# Patient Record
Sex: FEMALE | Race: WHITE
[De-identification: names, ages, dates, MRNs, and addresses within clinical notes are randomized per-mention and may not be internally consistent; named-entity substitution may affect disease eponyms.]

---

## 2017-05-05 ENCOUNTER — HOSPITAL ENCOUNTER (OUTPATIENT)
Dept: HOSPITAL 84 - D.MAMMO | Age: 62
Discharge: HOME | End: 2017-05-05
Attending: FAMILY MEDICINE
Payer: COMMERCIAL

## 2017-05-05 DIAGNOSIS — Z12.31: Primary | ICD-10-CM

## 2017-06-20 ENCOUNTER — HOSPITAL ENCOUNTER (OUTPATIENT)
Dept: HOSPITAL 84 - D.MAMMO | Age: 62
End: 2017-06-20
Attending: FAMILY MEDICINE
Payer: COMMERCIAL

## 2017-06-20 DIAGNOSIS — R92.8: Primary | ICD-10-CM

## 2017-09-29 ENCOUNTER — HOSPITAL ENCOUNTER (OUTPATIENT)
Dept: HOSPITAL 84 - D.MAMMO | Age: 62
Discharge: HOME | End: 2017-09-29
Attending: FAMILY MEDICINE
Payer: COMMERCIAL

## 2017-09-29 DIAGNOSIS — R92.8: Primary | ICD-10-CM

## 2018-05-10 ENCOUNTER — HOSPITAL ENCOUNTER (OUTPATIENT)
Dept: HOSPITAL 84 - D.MAMMO | Age: 63
Discharge: HOME | End: 2018-05-10
Attending: FAMILY MEDICINE
Payer: COMMERCIAL

## 2018-05-10 DIAGNOSIS — R92.8: Primary | ICD-10-CM

## 2018-05-11 ENCOUNTER — HOSPITAL ENCOUNTER (OUTPATIENT)
Dept: HOSPITAL 84 - D.MRI | Age: 63
Discharge: HOME | End: 2018-05-11
Attending: CLINICAL NURSE SPECIALIST
Payer: COMMERCIAL

## 2018-05-11 DIAGNOSIS — M79.671: Primary | ICD-10-CM

## 2018-06-13 ENCOUNTER — HOSPITAL ENCOUNTER (OUTPATIENT)
Dept: HOSPITAL 84 - D.MRI | Age: 63
Discharge: HOME | End: 2018-06-13
Attending: FAMILY MEDICINE
Payer: COMMERCIAL

## 2018-06-13 DIAGNOSIS — M54.6: Primary | ICD-10-CM

## 2018-08-29 ENCOUNTER — HOSPITAL ENCOUNTER (OUTPATIENT)
Dept: HOSPITAL 84 - D.MRI | Age: 63
Discharge: HOME | End: 2018-08-29
Attending: CLINICAL NURSE SPECIALIST
Payer: COMMERCIAL

## 2018-08-29 DIAGNOSIS — M25.522: Primary | ICD-10-CM

## 2018-11-27 ENCOUNTER — HOSPITAL ENCOUNTER (OUTPATIENT)
Dept: HOSPITAL 84 - D.MAMMO | Age: 63
Discharge: HOME | End: 2018-11-27
Attending: FAMILY MEDICINE
Payer: COMMERCIAL

## 2018-11-27 DIAGNOSIS — R92.8: Primary | ICD-10-CM

## 2018-12-18 ENCOUNTER — HOSPITAL ENCOUNTER (OUTPATIENT)
Dept: HOSPITAL 84 - D.LABREF | Age: 63
Discharge: HOME | End: 2018-12-18
Attending: ORTHOPAEDIC SURGERY
Payer: COMMERCIAL

## 2018-12-18 DIAGNOSIS — M17.12: Primary | ICD-10-CM

## 2018-12-18 DIAGNOSIS — Z11.8: ICD-10-CM

## 2019-01-30 ENCOUNTER — HOSPITAL ENCOUNTER (INPATIENT)
Dept: HOSPITAL 84 - D.CVICU | Age: 64
LOS: 8 days | Discharge: HOME | DRG: 470 | End: 2019-02-07
Attending: ORTHOPAEDIC SURGERY | Admitting: ORTHOPAEDIC SURGERY
Payer: COMMERCIAL

## 2019-01-30 VITALS — BODY MASS INDEX: 35.41 KG/M2 | WEIGHT: 180.38 LBS | HEIGHT: 60 IN

## 2019-01-30 DIAGNOSIS — M17.12: Primary | ICD-10-CM

## 2019-01-30 DIAGNOSIS — I10: ICD-10-CM

## 2019-01-30 DIAGNOSIS — G47.30: ICD-10-CM

## 2019-01-30 LAB
ANION GAP SERPL CALC-SCNC: 17.5 MMOL/L (ref 8–16)
APPEARANCE UR: CLEAR
APTT BLD: 27.2 SECONDS (ref 22.8–39.4)
BASOPHILS NFR BLD AUTO: 0.3 % (ref 0–2)
BILIRUB SERPL-MCNC: NEGATIVE MG/DL
BUN SERPL-MCNC: 20 MG/DL (ref 7–18)
CALCIUM SERPL-MCNC: 9.1 MG/DL (ref 8.5–10.1)
CHLORIDE SERPL-SCNC: 106 MMOL/L (ref 98–107)
CO2 SERPL-SCNC: 23.6 MMOL/L (ref 21–32)
COLOR UR: YELLOW
CREAT SERPL-MCNC: 1 MG/DL (ref 0.6–1.3)
EOSINOPHIL NFR BLD: 2.7 % (ref 0–7)
ERYTHROCYTE [DISTWIDTH] IN BLOOD BY AUTOMATED COUNT: 13.8 % (ref 11.5–14.5)
GLUCOSE SERPL-MCNC: 102 MG/DL (ref 74–106)
GLUCOSE SERPL-MCNC: NEGATIVE MG/DL
HCT VFR BLD CALC: 43.2 % (ref 36–48)
HGB BLD-MCNC: 13.8 G/DL (ref 12–16)
IMM GRANULOCYTES NFR BLD: 0.4 % (ref 0–5)
INR PPP: 0.96 (ref 0.85–1.17)
KETONES UR STRIP-MCNC: NEGATIVE MG/DL
LYMPHOCYTES NFR BLD AUTO: 26 % (ref 15–50)
MCH RBC QN AUTO: 27.8 PG (ref 26–34)
MCHC RBC AUTO-ENTMCNC: 31.9 G/DL (ref 31–37)
MCV RBC: 87.1 FL (ref 80–100)
MONOCYTES NFR BLD: 6.6 % (ref 2–11)
NEUTROPHILS NFR BLD AUTO: 64 % (ref 40–80)
NITRITE UR-MCNC: NEGATIVE MG/ML
OSMOLALITY SERPL CALC.SUM OF ELEC: 287 MOSM/KG (ref 275–300)
PH UR STRIP: 5 [PH] (ref 5–6)
PLATELET # BLD: 274 10X3/UL (ref 130–400)
PMV BLD AUTO: 11 FL (ref 7.4–10.4)
POTASSIUM SERPL-SCNC: 4.1 MMOL/L (ref 3.5–5.1)
PROT UR-MCNC: NEGATIVE MG/DL
PROTHROMBIN TIME: 12.3 SECONDS (ref 11.6–15)
RBC # BLD AUTO: 4.96 10X6/UL (ref 4–5.4)
SODIUM SERPL-SCNC: 143 MMOL/L (ref 136–145)
SP GR UR STRIP: 1.02 (ref 1–1.02)
UROBILINOGEN UR-MCNC: NORMAL MG/DL
WBC # BLD AUTO: 10.4 10X3/UL (ref 4.8–10.8)

## 2019-02-04 VITALS — DIASTOLIC BLOOD PRESSURE: 59 MMHG | SYSTOLIC BLOOD PRESSURE: 118 MMHG

## 2019-02-04 VITALS — DIASTOLIC BLOOD PRESSURE: 58 MMHG | SYSTOLIC BLOOD PRESSURE: 123 MMHG

## 2019-02-04 VITALS — DIASTOLIC BLOOD PRESSURE: 55 MMHG | SYSTOLIC BLOOD PRESSURE: 102 MMHG

## 2019-02-04 VITALS — SYSTOLIC BLOOD PRESSURE: 119 MMHG | DIASTOLIC BLOOD PRESSURE: 53 MMHG

## 2019-02-04 VITALS — SYSTOLIC BLOOD PRESSURE: 109 MMHG | DIASTOLIC BLOOD PRESSURE: 52 MMHG

## 2019-02-04 VITALS — DIASTOLIC BLOOD PRESSURE: 68 MMHG | SYSTOLIC BLOOD PRESSURE: 108 MMHG

## 2019-02-04 VITALS — DIASTOLIC BLOOD PRESSURE: 64 MMHG | SYSTOLIC BLOOD PRESSURE: 106 MMHG

## 2019-02-04 VITALS — SYSTOLIC BLOOD PRESSURE: 96 MMHG | DIASTOLIC BLOOD PRESSURE: 58 MMHG

## 2019-02-04 VITALS — SYSTOLIC BLOOD PRESSURE: 117 MMHG | DIASTOLIC BLOOD PRESSURE: 58 MMHG

## 2019-02-04 VITALS — SYSTOLIC BLOOD PRESSURE: 116 MMHG | DIASTOLIC BLOOD PRESSURE: 57 MMHG

## 2019-02-04 PROCEDURE — 0SRD0JA REPLACEMENT OF LEFT KNEE JOINT WITH SYNTHETIC SUBSTITUTE, UNCEMENTED, OPEN APPROACH: ICD-10-PCS | Performed by: ORTHOPAEDIC SURGERY

## 2019-02-04 NOTE — NUR
SUPINE IN BED, CPM IN USE. A&O X 4. INCENTIVE SPIROMETER AT BEDSIDE, PT
VERBALIZED/DEMONSTRATED KNOWLEDGE ON HOW TO USE.
DENIES PAIN AND STATES SHE IS STILL NUMB. DRESSING
TO LEFT KNEE IS CLEAN/DRY/INTACT. SCD AND PLEXIPULSE IN USE. REMOVED FROM CPM,
NO COMPLAINTS/REQUESTS AT THIS TIME, WILL CONTINUE TO MONITOR.

## 2019-02-04 NOTE — NUR
INCENTIVE SPIROMETRY GIVEN TO PATIENT AND INSTRUCTED ON USE. ABLE TO PULL
2000CC EACH TIME X 3. LEFT AT BEDSIDE FOR USE. STATES NO COMPLAINTS OF PAIN OR
DISCOMFORT AT PRESENT

## 2019-02-04 NOTE — OP
PATIENT NAME:  HELEN RODRIGUES                        MEDICAL RECORD: [id number]
:51                                             LOCATION:D.MS [id number]
                                                         ADMISSION DATE:19
SURGEON:  LOUIE PETTY MD        
 
 
DATE OF OPERATION:  2019
 
PREOPERATIVE DIAGNOSIS:  Degenerative arthritis, left knee.
 
POSTOPERATIVE DIAGNOSIS:  Degenerative arthritis, left knee.
 
PROCEDURE:  Left total knee arthroplasty -- press-fit Triathlon.
 
SURGEON:  Louie Petty MD
 
assistant responds.
 
FIRST ASSISTANT:  CLOTILDE Greenberg
 
INTRAOPERATIVE COMPLICATIONS:  None.
 
SUMMARY OF PATHOLOGIC FINDINGS:  The patient had significant degenerative
arthritis of the left knee consistent with the preoperative diagnosis.
 
IMPLANTS USED:  [address] Triathlon total knee arthroplasty, 4 distal femur, 11
polyethylene insert, 31 press-fit patella, size 4 tibial baseplate press-fit.
 
INTRAOPERATIVE FINDINGS:  The patient had severe tricompartmental osteoarthritis
mostly severe in the medial and patellofemoral compartment.
 
OPERATIVE SUMMARY IN DETAIL:  After obtaining the appropriate preoperative
orthopedic surgery consent as well as anesthetic consultation, evaluation and
clearance, the patient was brought to the operating room and placed on the
operating table in supine position.  After general laryngeal mask airway was
administered, tourniquet was placed on the proximal aspect of the left lower
extremity.  Left lower extremity was then prepped and draped in routine sterile
fashion.  The leg was elevated and exsanguinated, tourniquet was inflated to 350
mmHg.  Midline incision was taken down for paramedian arthrotomy.  Patella was
everted and distal femoral soft tissue was excised in the usual fashion. 
Intramedullary guide hole was created for distal femoral intramedullary guided
cut.  This was cut for a 10 mm resection.  Proximal tibia was exposed and
excised in its entirety.  Soft tissue excision was again done in the usual
fashion.  An intramedullary guide hole was created to create an intramedullary
guided tibial cut.  Measurements were taken.  Distal chamfer cuts were made. 
Trials were put into place corresponding to the above final components.  The
knee was taken through range of motion and found to be stable in all planes. 
Final distal femoral preparations were followed by proximal tibial preparations
for press fitting.  The arthritic surface of the patella was excised and it was
prepared for press fitting the patella.  Having completed this, trials were
taken out.  The knee was copiously irrigated in pulsatile lavage fashion.  Final
components were then press-fit into place.  The knee was taken through range of
motion and found to be stable in all planes with good patellar tracking.  Prior
to closure of the paramedian arthrotomy, the knee was laden with vancomycin
powder 1 gram, tobramycin powder 1 gram, and TXA.  Paramedian arthrotomy was
closed with #2 Ethibond followed by #1 Vicryl, 2-0 Vicryl, and skin staples. 
Sterile dressings were applied.  Tourniquet was deflated.  The patient was
awakened and taken to recovery room in stable condition.  Please note, Rich
 
 
 
OPERATIVE REPORT                               [id number]    HELEN RODRIGUES assisted in closure.  All final needle and sponge counts were correct.
 
TRANSINT:[id number] Voice Confirmation ID: [id number] DOCUMENT ID: [id number]
                                           
                                           MALINDA SLAUGHTER, LOUIE JACKMAN        
 
 
 
Electronically Signed by LOUIE PETTY MD on 19 at 1401
 
 
 
 
 
 
 
 
 
 
 
 
 
 
 
 
 
 
 
 
 
 
 
 
 
 
 
 
 
 
 
 
 
 
 
 
 
 
CC:                                                             [id number]
DICTATION DATE: 19     :     19 0953      ADM IN  
                                                                              
[facility]                                          
[address] [id number]

## 2019-02-04 NOTE — NUR
PT STATES NO PAIN OR DISCOMFORT AT PRESENT. PLACED IN CPM  WILL CONTINUE TO
MONITOR. CALL LIGHT IN REACH

## 2019-02-05 VITALS — SYSTOLIC BLOOD PRESSURE: 137 MMHG | DIASTOLIC BLOOD PRESSURE: 57 MMHG

## 2019-02-05 VITALS — SYSTOLIC BLOOD PRESSURE: 125 MMHG | DIASTOLIC BLOOD PRESSURE: 62 MMHG

## 2019-02-05 VITALS — DIASTOLIC BLOOD PRESSURE: 73 MMHG | SYSTOLIC BLOOD PRESSURE: 114 MMHG

## 2019-02-05 VITALS — SYSTOLIC BLOOD PRESSURE: 121 MMHG | DIASTOLIC BLOOD PRESSURE: 60 MMHG

## 2019-02-05 VITALS — DIASTOLIC BLOOD PRESSURE: 59 MMHG | SYSTOLIC BLOOD PRESSURE: 121 MMHG

## 2019-02-05 LAB
APPEARANCE UR: CLEAR
BILIRUB SERPL-MCNC: NEGATIVE MG/DL
COLOR UR: YELLOW
ERYTHROCYTE [DISTWIDTH] IN BLOOD BY AUTOMATED COUNT: 13.8 % (ref 11.5–14.5)
GLUCOSE SERPL-MCNC: NEGATIVE MG/DL
HCT VFR BLD CALC: 36.1 % (ref 36–48)
HGB BLD-MCNC: 11.5 G/DL (ref 12–16)
KETONES UR STRIP-MCNC: NEGATIVE MG/DL
MCH RBC QN AUTO: 27.4 PG (ref 26–34)
MCHC RBC AUTO-ENTMCNC: 31.9 G/DL (ref 31–37)
MCV RBC: 86 FL (ref 80–100)
NITRITE UR-MCNC: NEGATIVE MG/ML
PH UR STRIP: 6 [PH] (ref 5–6)
PLATELET # BLD: 212 10X3/UL (ref 130–400)
PMV BLD AUTO: 11 FL (ref 7.4–10.4)
PROT UR-MCNC: NEGATIVE MG/DL
RBC # BLD AUTO: 4.2 10X6/UL (ref 4–5.4)
SP GR UR STRIP: 1.01 (ref 1–1.02)
UROBILINOGEN UR-MCNC: NORMAL MG/DL
WBC # BLD AUTO: 16.8 10X3/UL (ref 4.8–10.8)

## 2019-02-05 NOTE — MORECARE
CASE MANAGEMENT DISCHARGE SUMMARY
 
 
PATIENT: HELEN RODRIGUES                  UNIT: [id number]
ACCOUNT#: [id number]                       ADM DATE: 19
AGE: 63     : [birth year]  SEX: F            ROOM/BED: D.2216    
AUTHOR: ED,DOC                             PHYSICIAN:                               
 
REFERRING PHYSICIAN: LOUIE PETTY MD        
DATE OF SERVICE: 19
Discharge Plan
 
 
Patient Name: HELEN RODRIGUES
Facility: [facility]:[place]
Encounter #: [id number]
Medical Record #: [id number]
: 1955
Planned Disposition: Home or Self Care
Anticipated Discharge Date: 
 
Discharge Date: 
Expected LOS: 
Initial Reviewer: [email or web address]
Initial Review Date: 2019
Generated: 19   6:16 pm 
Comments
 
DCP- Discharge Planning
 
Updated by [id number]: Nely Gottlieb on 19   4:14 pm CT
Patient Name: HELEN RODRIGUES                                     
Admission Status: Elective   
Accout number: [id number]                              
Admission Date: 2019   
: 1955                                                        
Admission Diagnosis:   
Attending: LOUIE PETTY                                               
Current LOS:  1   
  
Anticipated DC Date:    
Planned Disposition: Home or Self Care   
[facility]   
  
  
Discharge Planning Comments:   
CM met with patient to assess discharge planning needs. Patient lives 
independently at home where she plans to return at discharge. Her  
will be the one to drive her home at DC. She will do her OP PT @ [facility]. CM 
will set up that appointment prior to DC. She has a walker, CPM and BSC at 
 
home. There are 5 steps to enter in her home. CM will continue to follow and 
assist with DC planning.   
  
  
  
  
  
: Nely Gottlieb
 DCPIA - Discharge Planning Initial Assessment
 
Updated by [email or web address]: Nely Gottlieb on 19   5:12 pm
*  Is the patient Alert and Oriented?
Yes
*  How many steps to enter\exit or inside your home?
 
*  PCP
[facility]
*  Pharmacy
[facility]
*  Preadmission Environment
Home with Family
*  ADLs
Independent
*  Equipment
Bedside Commode
Walker
*  Other Equipment
CPM
*  List name and contact numbers for known caregivers / representatives who 
currently or will assist patient after discharge:
Bam (spouse) [phone]
*  Verbal permission to speak to the caregivers and representatives has been 
obtained from the patient.
N/A
*  Community resources currently utilized
None
*  Additional services required to return to the preadmission environment?
Yes
*  Can the patient safely return to the preadmission environment?
Yes
*  Has this patient been hospitalized within the prior 30 days at any 
hospital?
No
 
 
 
 
 
 
Patient Name: HELEN RODRIGUES
 
Encounter #: [id number]
Page [phone]
 
 
 
 
 
Electronically Signed by GINA WARD on 19 at 1716
 
 
 
 
 
 
**All edits/amendments must be made on the electronic document**
 
DICTATION DATE: 19     : GEORGINA  19     
RPT#: [id number]                                DC DATE:        
                                               STATUS: ADM IN  
[facility]
[address] [place] [address]
***END OF REPORT***

## 2019-02-06 VITALS — SYSTOLIC BLOOD PRESSURE: 117 MMHG | DIASTOLIC BLOOD PRESSURE: 50 MMHG

## 2019-02-06 VITALS — SYSTOLIC BLOOD PRESSURE: 121 MMHG | DIASTOLIC BLOOD PRESSURE: 58 MMHG

## 2019-02-06 VITALS — DIASTOLIC BLOOD PRESSURE: 59 MMHG | SYSTOLIC BLOOD PRESSURE: 132 MMHG

## 2019-02-06 VITALS — SYSTOLIC BLOOD PRESSURE: 118 MMHG | DIASTOLIC BLOOD PRESSURE: 54 MMHG

## 2019-02-06 VITALS — SYSTOLIC BLOOD PRESSURE: 130 MMHG | DIASTOLIC BLOOD PRESSURE: 54 MMHG

## 2019-02-06 VITALS — DIASTOLIC BLOOD PRESSURE: 62 MMHG | SYSTOLIC BLOOD PRESSURE: 181 MMHG

## 2019-02-06 LAB
ERYTHROCYTE [DISTWIDTH] IN BLOOD BY AUTOMATED COUNT: 14.1 % (ref 11.5–14.5)
HCT VFR BLD CALC: 36.7 % (ref 36–48)
HGB BLD-MCNC: 11.5 G/DL (ref 12–16)
MCH RBC QN AUTO: 27.1 PG (ref 26–34)
MCHC RBC AUTO-ENTMCNC: 31.3 G/DL (ref 31–37)
MCV RBC: 86.6 FL (ref 80–100)
PLATELET # BLD: 216 10X3/UL (ref 130–400)
PMV BLD AUTO: 11.3 FL (ref 7.4–10.4)
RBC # BLD AUTO: 4.24 10X6/UL (ref 4–5.4)
WBC # BLD AUTO: 11.8 10X3/UL (ref 4.8–10.8)

## 2019-02-06 NOTE — NUR
PT SITTING UP IN BED, NO SIGNS OF DISTRESS. ALERT AND ORIENTED. LEFT FA IV SL.
LEFT KNEE WRAPPED IN ACE WRAP. TAKEN OFF CPM. PT GIVEN VISTARIL AND OXY AS
ORDERED FOR PAIN 4/10. NO OTHER NEEDS OR COMPLAINTS AT THIS TIME. SCD TO LEFT
LEG, PLEXI TO RIGHT. CL IN REACH, WILL CONTINUE TO MONITOR

## 2019-02-07 VITALS — SYSTOLIC BLOOD PRESSURE: 133 MMHG | DIASTOLIC BLOOD PRESSURE: 63 MMHG

## 2019-02-07 VITALS — SYSTOLIC BLOOD PRESSURE: 108 MMHG | DIASTOLIC BLOOD PRESSURE: 57 MMHG

## 2019-02-07 NOTE — NUR
PT IS RESTING IN BED WITH EYES OPEN. RESPIRATIONS ARE EVEN AND UNLABORED. PT
REPORTS PAIN 3/10 IN KNEE AREA. WILL ADDRESS. PT WITH CPM TO LEFT LOWER
EXTREMITY. DRESSING IS C/D/I. PT MACK NUMBNESS AND/OR TINGLING IN LOWER
EXTREMITIES. BED IS IN THE LOWEST POSITION. CALL LIGHT AND BED SIDE TABLE ARE
WITHIN REACH. WILL CONT TO MONITOR.

## 2019-02-07 NOTE — NUR
PT IS SITTING ON SIDE OF BED. FAMILY IS PRESENT TRANSPORT PT HOME. PIV REMOVED
WITH CATHETER TIP INTACT. PT DENIES QUESTIONS AND/OR CONCERNS AT THIS TIME.
VOLUNTEERS CONTACTED TO TRANSPORT PT OUT OF ROOM.

## 2019-02-07 NOTE — MORECARE
CASE MANAGEMENT DISCHARGE SUMMARY
 
 
PATIENT: HELEN RODRIGUES                  UNIT: [id number]
ACCOUNT#: [id number]                       ADM DATE: 19
AGE: 63     : 51  SEX: F            ROOM/BED: D.2216    
AUTHOR: ED,DOC                             PHYSICIAN:                               
 
REFERRING PHYSICIAN: LOUIE PETTY MD        
DATE OF SERVICE: 19
Discharge Plan
 
 
Patient Name: HELEN RODRIGUES
Facility: [facility]:[place]
Encounter #: [id number]
Medical Record #: [id number]
: 1955
Planned Disposition: Home or Self Care
Anticipated Discharge Date: 
 
Discharge Date: 
Expected LOS: 
Initial Reviewer: [id number]
Initial Review Date: 2019
Generated: 19  10:28 am 
Comments
 
DCP- Discharge Planning
 
Updated by [email or web address]: Nely Gottlieb on 19   4:14 pm CT
Patient Name: HELEN RODRIGUES                                     
Admission Status: Elective   
Accout number: [id number]                              
Admission Date: 2019   
: 1955                                                        
Admission Diagnosis:   
Attending: LOUIE PETTY                                               
Current LOS:  1   
  
Anticipated DC Date:    
Planned Disposition: Home or Self Care   
Primary Insurance: [employer] [facility]   
  
  
Discharge Planning Comments:   
CM met with patient to assess discharge planning needs. Patient lives 
independently at home where she plans to return at discharge. Her  
will be the one to drive her home at DC. She will do her OP PT @ [facility]. CM 
will set up that appointment prior to DC. She has a walker, CPM and BSC at 
 
home. There are 5 steps to enter in her home. CM will continue to follow and 
assist with DC planning.   
  
  
  
  
  
: Nely Gottlieb
 DCPIA - Discharge Planning Initial Assessment
 
Updated by [email or web address]: Nely Gottlieb on 19   5:12 pm
*  Is the patient Alert and Oriented?
Yes
*  How many steps to enter\exit or inside your home?
 
*  PCP
[facility]
*  [facility]
*  Preadmission Environment
Home with Family
*  ADLs
Independent
*  Equipment
Bedside Commode
Walker
*  Other Equipment
CPM
*  List name and contact numbers for known caregivers / representatives who 
currently or will assist patient after discharge:
Bam (spouse) [phone]
*  Verbal permission to speak to the caregivers and representatives has been 
obtained from the patient.
N/A
*  Community resources currently utilized
None
*  Additional services required to return to the preadmission environment?
Yes
*  Can the patient safely return to the preadmission environment?
Yes
*  Has this patient been hospitalized within the prior 30 days at any 
hospital?
No
 
 
 
 
 
 
 
Last DP export: 19   4:16 pm
Patient Name: HELEN RODRIGUES
 
Encounter #: [id number]
Page [id number]
 
 
 
 
 
Electronically Signed by GINA WARD on 19 at 0928
 
 
 
 
 
 
**All edits/amendments must be made on the electronic document**
 
DICTATION DATE: 19     : DM  19     
RPT#: [id number]                                DC DATE:        
                                               STATUS: ADM IN  
[facility]
[address] [place] [address]
***END OF REPORT***

## 2019-02-07 NOTE — MORECARE
CASE MANAGEMENT DISCHARGE SUMMARY
 
 
PATIENT: HELEN RODRIGUES                  UNIT: [id number]
ACCOUNT#: [id number]                       ADM DATE: 19
AGE: 63     : [birth year]  SEX: F            ROOM/BED: D.2216    
AUTHOR: ED,DOC                             PHYSICIAN:                               
 
REFERRING PHYSICIAN: LOUIE PETTY MD        
DATE OF SERVICE: 19
Discharge Plan
 
 
Patient Name: HELEN RODRIGUES
Facility: [facility]:[place]
Encounter #: [id number]
Medical Record #: [id number]
: 1955
Planned Disposition: Home or Self Care
Anticipated Discharge Date: 
 
Discharge Date: 
Expected LOS: 
Initial Reviewer: [email or web address]
Initial Review Date: 2019
Generated: 19  10:36 am 
Comments
 
DCP- Discharge Planning
 
Updated by [email or web address]: Nely Gottlieb on 19   8:28 am CT
Patient discharging home today, she would like to use [employer] at 
her 1st choice   
blake signed and placed in chart. I spoke with La and will send referral.
 Her  will be driving her home and she has all her DME that she needs. 
CM will continue to follow and assist with DC planning as needed
DCP- Discharge Planning
 
Updated by [email or web address]: Nely Gottlieb on 19   4:14 pm CT
Patient Name: HELEN RODRIGUES                                     
Admission Status: Elective   
Accout number: [id number]                              
Admission Date: 2019   
: 1955                                                        
Admission Diagnosis:   
Attending: LOUIE PETTY                                               
Current LOS:  1   
  
Anticipated DC Date:    
Planned Disposition: Home or Self Care   
 
Primary Insurance: [facility]   
  
  
Discharge Planning Comments:   
CM met with patient to assess discharge planning needs. Patient lives 
independently at home where she plans to return at discharge. Her  
will be the one to drive her home at DC. She will do her OP PT @ [facility]. CM 
will set up that appointment prior to DC. She has a walker, CPM and BSC at 
home. There are 5 steps to enter in her home. CM will continue to follow and 
assist with DC planning.   
  
  
  
  
  
: Nely Gottlieb
 DCPIA - Discharge Planning Initial Assessment
 
Updated by [id number]: Nely Gottlieb on 19   5:12 pm
*  Is the patient Alert and Oriented?
Yes
*  How many steps to enter\exit or inside your home?
 
*  PCP
[facility]
*  [facility]acy
[facility]
*  Preadmission Environment
Home with Family
*  ADLs
Independent
*  Equipment
Bedside Commode
Walker
*  Other Equipment
CPM
*  List name and contact numbers for known caregivers / representatives who 
currently or will assist patient after discharge:
Bam (spouse) [phone]
*  Verbal permission to speak to the caregivers and representatives has been 
obtained from the patient.
N/A
*  Community resources currently utilized
None
*  Additional services required to return to the preadmission environment?
Yes
*  Can the patient safely return to the preadmission environment?
Yes
*  Has this patient been hospitalized within the prior 30 days at any 
hospital?
No
 
 
External Providers
External Provider: [facility]
 
Next Contact Date: 
Service Request Date: 
Service Type: 
Resolution: 
 
Reviewer: 
Comments: 
 
 
 
 
 
 
Last DP export: 19   8:28 am
Patient Name: HELEN RODRIGUES
Encounter #: [id number]
Page [id number]
 
 
 
 
 
Electronically Signed by GINA WARD on 19 at 0936
 
 
 
 
 
 
**All edits/amendments must be made on the electronic document**
 
DICTATION DATE: 19     : GEORGINA  19     
RPT#: [id number]                                DC DATE:        
                                               STATUS: ADM IN  
[facility]
[address] [place] [address]
***END OF REPORT***

## 2019-02-07 NOTE — NUR
DISCHARGE INSTRUCTIONS GIVEN TO PT. (3) PRINTED RX GIVEN TO PT. DISCHARGE
PAPERWORK SIGNED. PT DENIES AND QUESTIONS AND/OR CONCERNS AT THIS TIME. PT
REPORTS THAT TRANSPORTATION HOME IS NOT AVAILABLE AT THIS TIME. WILL CONT TO
MONITOR PT. PT TO NOTIFY WHEN READY FOR TRANSPORT HOME.

## 2019-02-11 NOTE — MORECARE
CASE MANAGEMENT DISCHARGE SUMMARY
 
 
PATIENT: HELEN RODRIGUES                  UNIT: [id number]
ACCOUNT#: [id number]                       ADM DATE: 19
AGE: 63     : 51  SEX: F            ROOM/BED: D.2216    
AUTHOR: ED,DOC                             PHYSICIAN:                               
 
REFERRING PHYSICIAN: LOUIE PETTY MD        
DATE OF SERVICE: 19
Discharge Plan
 
 
Patient Name: HELEN RODRIGUES
Facility: [facility]:[place]
Encounter #: [id number]
Medical Record #: [id number]
: 1955
Planned Disposition: Home or Self Care
Anticipated Discharge Date: 
 
Discharge Date: 2019
Expected LOS: 0
Initial Reviewer: [email or web address]
Initial Review Date: 2019
Generated: 19  12:58 pm 
Comments
 
DCP- Discharge Planning
 
Updated by [email or web address]: Nely Gottlieb on 19   8:28 am CT
Patient discharging home today, she would like to use [facility] at 
her 1st choice   
blake signed and placed in chart. I spoke with La and will send referral.
 Her  will be driving her home and she has all her DME that she needs. 
CM will continue to follow and assist with DC planning as needed
DCP- Discharge Planning
 
Updated by [email or web address]: Nely Gottlieb on 19   4:14 pm CT
Patient Name: HELEN RODRIGUES                                     
Admission Status: Elective   
Accout number: [id number]                              
Admission Date: 2019   
: 1955                                                        
Admission Diagnosis:   
Attending: LOUIE PETTY                                               
Current LOS:  1   
  
Anticipated DC Date:    
Planned Disposition: Home or Self Care   
 
Primary Insurance: [employer] [facility]   
  
  
Discharge Planning Comments:   
CM met with patient to assess discharge planning needs. Patient lives 
independently at home where she plans to return at discharge. Her  
will be the one to drive her home at DC. She will do her OP PT @ [facility]. CM 
will set up that appointment prior to DC. She has a walker, CPM and BSC at 
home. There are 5 steps to enter in her home. CM will continue to follow and 
assist with DC planning.   
  
  
  
  
  
: Nely Gottlieb
 DCPIA - Discharge Planning Initial Assessment
 
Updated by [id number]: Nely Gottlieb on 19   5:12 pm
*  Is the patient Alert and Oriented?
Yes
*  How many steps to enter\exit or inside your home?
 
*  PCP
[facility]
*  Ph[facility]acy
[facility]
*  Preadmission Environment
Home with Family
*  ADLs
Independent
*  Equipment
Bedside Commode
Walker
*  Other Equipment
CPM
*  List name and contact numbers for known caregivers / representatives who 
currently or will assist patient after discharge:
Bam (spouse) [phone]
*  Verbal permission to speak to the caregivers and representatives has been 
obtained from the patient.
N/A
*  Community resources currently utilized
None
*  Additional services required to return to the preadmission environment?
Yes
*  Can the patient safely return to the preadmission environment?
Yes
*  Has this patient been hospitalized within the prior 30 days at any 
hospital?
No
 
 
 
 
 
 
 
 
Last DP export: 19   8:36 am
Patient Name: HELEN RODRIGUES
Encounter #: [id number]
Page [phone]
 
 
 
 
 
Electronically Signed by GINA WARD on 19 at 1159
 
 
 
 
 
 
**All edits/amendments must be made on the electronic document**
 
DICTATION DATE: 198     : GEORGINA  198     
RPT#: [id number]                                DC DATE:19
                                               STATUS: DIS IN  
[facility]
[address] [place] [address]
***END OF REPORT***

## 2019-06-26 ENCOUNTER — HOSPITAL ENCOUNTER (OUTPATIENT)
Dept: HOSPITAL 84 - D.LABREF | Age: 64
Discharge: HOME | End: 2019-06-26
Attending: SURGERY
Payer: COMMERCIAL

## 2019-06-26 VITALS — BODY MASS INDEX: 35.2 KG/M2

## 2019-06-26 DIAGNOSIS — M75.91: Primary | ICD-10-CM

## 2019-10-09 ENCOUNTER — HOSPITAL ENCOUNTER (OUTPATIENT)
Dept: HOSPITAL 84 - D.LABREF | Age: 64
Discharge: HOME | End: 2019-10-09
Attending: SURGERY
Payer: COMMERCIAL

## 2019-10-09 VITALS — BODY MASS INDEX: 35.2 KG/M2

## 2019-10-09 DIAGNOSIS — L02.413: Primary | ICD-10-CM

## 2020-07-03 ENCOUNTER — HOSPITAL ENCOUNTER (OUTPATIENT)
Dept: HOSPITAL 84 - D.MRI | Age: 65
Discharge: HOME | End: 2020-07-03
Attending: NEUROLOGICAL SURGERY
Payer: COMMERCIAL

## 2020-07-03 DIAGNOSIS — M54.16: Primary | ICD-10-CM
